# Patient Record
Sex: FEMALE | Race: WHITE | NOT HISPANIC OR LATINO | Employment: FULL TIME | URBAN - METROPOLITAN AREA
[De-identification: names, ages, dates, MRNs, and addresses within clinical notes are randomized per-mention and may not be internally consistent; named-entity substitution may affect disease eponyms.]

---

## 2017-01-19 ENCOUNTER — TELEPHONE (OUTPATIENT)
Dept: OBGYN | Facility: CLINIC | Age: 67
End: 2017-01-19

## 2017-01-19 NOTE — TELEPHONE ENCOUNTER
Spoke with patient- patient looking to schedule only at the  OB location with Dr. Valdez due to transportation issues.  Patient notified of next available appointment.  Patient states she will call back if she wishes to schedule.

## 2017-01-19 NOTE — TELEPHONE ENCOUNTER
Reason for Call:  Appointment     Detailed comments: Patient states she has pessary that moved around. Patient like to know if Dr. Valdez will reinsert it . Please call .     Phone Number Patient can be reached at: Home number on file [phone] (home)    Best Time: any    Can we leave a detailed message on this number? YES    Call taken on 1/19/2017 at 2:36 PM by Martina Rowland

## 2021-05-04 ENCOUNTER — IMMUNIZATION (OUTPATIENT)
Dept: NURSING | Facility: CLINIC | Age: 71
End: 2021-05-04
Payer: COMMERCIAL

## 2021-05-04 PROCEDURE — 91300 PR COVID VAC PFIZER DIL RECON 30 MCG/0.3 ML IM: CPT

## 2021-05-04 PROCEDURE — 0001A PR COVID VAC PFIZER DIL RECON 30 MCG/0.3 ML IM: CPT

## 2021-05-25 ENCOUNTER — IMMUNIZATION (OUTPATIENT)
Dept: NURSING | Facility: CLINIC | Age: 71
End: 2021-05-25
Attending: FAMILY MEDICINE
Payer: COMMERCIAL

## 2021-05-25 PROCEDURE — 0002A PR COVID VAC PFIZER DIL RECON 30 MCG/0.3 ML IM: CPT

## 2021-05-25 PROCEDURE — 91300 PR COVID VAC PFIZER DIL RECON 30 MCG/0.3 ML IM: CPT

## 2023-03-06 ENCOUNTER — TELEPHONE (OUTPATIENT)
Dept: OBGYN | Facility: CLINIC | Age: 73
End: 2023-03-06
Payer: COMMERCIAL

## 2023-03-06 NOTE — TELEPHONE ENCOUNTER
"Pt had a pessary placed at  but had it removed at the [facility] ED due to pain and she was told it was in the \"wrong place\".  She states it was not replaced due to there was not an OB/GYN was not on staff at the ED to replace it.      Pt has the pessary and she just needs it replaced and would like to be seen by Dr. Valdez at the [facility] as it is close to home.  She states she is elderly and doesn't want to drive too far.    Routing to Dr. Valdez to see if she is able to see pt for a pessary insertion.      Char Artis, RN    "

## 2023-03-10 NOTE — TELEPHONE ENCOUNTER
Left pt a detailed message asking her to call [phone] to schedule an appt with Dr. Valdez at the [facility] for a pessary placement.    Char Artis RN

## 2023-03-10 NOTE — TELEPHONE ENCOUNTER
She has not seen the  OB/GYN department.   She may set up appointment through schedulers.   There is likely to be some wait time, however.